# Patient Record
Sex: FEMALE | Race: WHITE | NOT HISPANIC OR LATINO | URBAN - METROPOLITAN AREA
[De-identification: names, ages, dates, MRNs, and addresses within clinical notes are randomized per-mention and may not be internally consistent; named-entity substitution may affect disease eponyms.]

---

## 2024-01-15 ENCOUNTER — TELEPHONE (OUTPATIENT)
Dept: NEUROLOGY | Facility: CLINIC | Age: 89
End: 2024-01-15

## 2024-01-15 NOTE — TELEPHONE ENCOUNTER
Raquel wanted to get a sooner appointment for her mother to discuss her worsening symptoms, feels like she's deteriorating, and over medicated. I did tell her about how far you're booking and she does have an appointment in March. She would like a call to discuss in the mean time.      Please advise,  Thank you!

## 2024-01-16 RX ORDER — LOPERAMIDE HYDROCHLORIDE 2 MG/1
2 CAPSULE ORAL 4 TIMES DAILY PRN
COMMUNITY
Start: 2024-01-07

## 2024-01-16 RX ORDER — ATORVASTATIN CALCIUM 40 MG/1
40 TABLET, FILM COATED ORAL DAILY
COMMUNITY
Start: 2021-01-21

## 2024-01-16 RX ORDER — ESCITALOPRAM OXALATE 5 MG/1
5 TABLET ORAL DAILY
COMMUNITY
Start: 2023-11-24

## 2024-01-16 RX ORDER — LORAZEPAM 0.5 MG/1
0.5 TABLET ORAL EVERY 6 HOURS PRN
COMMUNITY
Start: 2023-10-13

## 2024-01-16 RX ORDER — DIVALPROEX SODIUM 125 MG/1
125 CAPSULE, COATED PELLETS ORAL 2 TIMES DAILY
COMMUNITY
Start: 2022-03-09

## 2024-01-16 RX ORDER — QUETIAPINE FUMARATE 25 MG/1
25 TABLET, FILM COATED ORAL 2 TIMES DAILY
COMMUNITY
Start: 2023-11-24

## 2024-01-16 NOTE — TELEPHONE ENCOUNTER
"She was \"semi lucid\" and now is \"impossible\" after a recent viral illness at the facility   She is living at a facility now on the [place]. She was started on seroquel 25mg TID, but causes too much sedation   Also may be on lorazepam as needed   She does better with the male nurses than the female nurses   Also needs a geriatric doctor, recommended Dr. Surjit Wharton   Daughter to fax her med list to me and will make recommendations   "